# Patient Record
Sex: FEMALE | Race: BLACK OR AFRICAN AMERICAN | NOT HISPANIC OR LATINO | Employment: UNEMPLOYED | URBAN - METROPOLITAN AREA
[De-identification: names, ages, dates, MRNs, and addresses within clinical notes are randomized per-mention and may not be internally consistent; named-entity substitution may affect disease eponyms.]

---

## 2018-02-26 ENCOUNTER — HISTORICAL (OUTPATIENT)
Dept: ADMINISTRATIVE | Facility: HOSPITAL | Age: 43
End: 2018-02-26

## 2018-03-02 ENCOUNTER — HISTORICAL (OUTPATIENT)
Dept: ADMINISTRATIVE | Facility: HOSPITAL | Age: 43
End: 2018-03-02

## 2018-04-02 ENCOUNTER — HISTORICAL (OUTPATIENT)
Dept: RADIOLOGY | Facility: HOSPITAL | Age: 43
End: 2018-04-02

## 2018-04-16 ENCOUNTER — HISTORICAL (OUTPATIENT)
Dept: ADMINISTRATIVE | Facility: HOSPITAL | Age: 43
End: 2018-04-16

## 2021-01-30 ENCOUNTER — HISTORICAL (OUTPATIENT)
Dept: ADMINISTRATIVE | Facility: HOSPITAL | Age: 46
End: 2021-01-30

## 2021-02-01 LAB — FINAL CULTURE: NORMAL

## 2022-04-07 ENCOUNTER — HISTORICAL (OUTPATIENT)
Dept: ADMINISTRATIVE | Facility: HOSPITAL | Age: 47
End: 2022-04-07

## 2022-04-24 VITALS
HEIGHT: 64 IN | DIASTOLIC BLOOD PRESSURE: 100 MMHG | OXYGEN SATURATION: 100 % | SYSTOLIC BLOOD PRESSURE: 156 MMHG | WEIGHT: 220.44 LBS | BODY MASS INDEX: 37.63 KG/M2

## 2022-05-01 NOTE — HISTORICAL [facility] CERNER
This is a historical note converted from [employer]. Formatting and pictures may have been removed.  Please reference [employer] for original formatting and attached multimedia. Chief Complaint  Frequent urination with burning X 3 days. Vomited X 2 since last night. No known COVID exposure. Recurrent UTIs with vomiting.  History of Present Illness  Pt?45 Years?Black or ?Female?seen and evaluated in a limited status for concern for COVID-19. In order to decrease the risk of exposure to the hospital and health care workers, only a limited exam was done.  Today presents to clinic c/o Frequent urination with burning X 3 days. Vomited X 2 since last night. No known COVID exposure. Vomited once last night and once today.  Last UTI diagnosed 1/20/2021. Pt prescribed Macrobid.  Last urine culture showing in system resulted in Citrobacter koseri  Pt reports frequency, urgency, burning and lower abd discomfort. Denies fever.  Review of Systems  General: negative except as stated in hpi  Skin: negative except as stated in hpi  HEENT: negative except as stated in hpi  Respiratory: negative except as stated in hpi  CV: negative except as stated in hpi  GI: negative except as stated in hpi  : negative except as stated in hpi  MS: negative except as stated in hpi  Neuro: negative except as stated in hpi  Hematologic: negative except as stated in hpi  Endocrine: negative except as stated in hpi  Psych: negative except as stated in hpi  Physical Exam  Vitals & Measurements  T:?36.7? ?C (Oral)? HR:?77(Peripheral)? RR:?20? BP:?156/100? SpO2:?100%?  HT:?162.00?cm? WT:?100.000?kg? BMI:?38.1?  General: Alert and oriented, No acute distress.  Head: Normocephalic, Atraumatic.  Neck/Thyroid: Supple, Non-tender, No carotid bruit, No lymphadenopathy, Full range of motion.  Respiratory: Clear to auscultation bilaterally, Respirations non-labored, Breath sounds equal, Symmetrical chest wall expansion, No wheezes, rales or  rhonchi.  Cardiovascular: Regular rate and rhythm, No murmurs, rubs or gallops.  Gastrointestinal: Soft, Non-tender, Non-distended, Normal bowel sounds.  Assessment/Plan  1.?UTI (urinary tract infection)?N39.0,?UTI (urinary tract infection)?N39.0  Urine dip showed trace LE and blood, neg nitrite  Urine micro and culture pending, will notify  Rocephin 1GM IM now in clinic-pt requests injection to jump start  Pyridium 200mg po TID prn spasm x 3 days only  Zofran 8mg ODT prn nausea/vomiting  Increase oral fluid intake of water. Empty the bladder often, washing hands after going to the bathroom.?Avoid bubble baths and sitting in soapy bath water for long periods of time. Females should wipe front to back.?Eliminate bladder irritants such as spicy foods, caffeine, tomatoes, and citrus foods from diet.?  Ordered:  cefTRIAXone, 1 gm, form: Injection, IM, Once-Unscheduled, first dose 01/30/21 15:54:00 CST  Lidocaine inj., 2.1 mL, form: Injection, IM, Once-Unscheduled, first dose 01/30/21 15:54:00 CST  ondansetron, 8 mg = 1 tab(s), Oral, q8hr, # 15 tab(s), 0 Refill(s), Pharmacy: [facility]/pharmacy #[id number], 162, cm, Height/Length Dosing, 01/30/21 15:37:00 CST, 100, kg, Weight Dosing, 01/30/21 15:37:00 CST  phenazopyridine, 200 mg = 1 tab(s), Oral, TID, X 3 day(s), # 9 tab(s), 0 Refill(s), Pharmacy: [facility]/pharmacy #[id number], 162, cm, Height/Length Dosing, 01/30/21 15:37:00 CST, 100, kg, Weight Dosing, 01/30/21 15:37:00 CST  ?  2.?Obesity?E66.9  BMI?38.1.? Goal BMI less than 30.  Aerobic exercise 150min/week  Avoid soda, simple sugars, saturated fats and processed foods, sweets, excessive rice, pasta, potatoes, bread  Limit fast food  Eat plenty of fresh fruits and vegetables  Ordered:  Office/Outpatient Visit Level 4 Established 17790 PC, Obesity, 01/30/21 16:01:00 CST  ?  Orders:  Urinalysis Microscopic [facility], Routine collect, Urine, Collected, 01/30/21 15:39:00 CST, Stop date 01/30/21 15:39:00 CST, Nurse collect, Burning with  urination  Urinalysis with Microscopic if Indicated, Routine collect, Urine, 01/30/21 15:39:00 CST, Stop date 01/30/21 15:39:00 CST, Nurse collect, Burning with urination  Urine Culture [id number], Routine collect, 01/30/21 15:43:00 CST, Urine, Nurse collect, Stop date 01/30/21 15:43:00 CST, Burning with urination   Problem List/Past Medical History  Ongoing  Obesity  Historical  No qualifying data  Procedure/Surgical History  bilateral Tubal Ligation  partial hysterectomy   Medications  ALPRAZOLAM 0.5MG TAB, 0.5 mg= 1 tab(s), Oral, BID,? ?Not taking  alPRAzolam 1 mg oral tablet, 1 mg= 1 tab(s), Oral, TID  AMPHET/DEXTR 10MG TAB, 10 mg= 1 tab(s), Oral, Daily,? ?Not Taking, Completed Rx  AMPHET/DEXTR 30MG TAB, 30 mg= 1 tab(s), Oral, qAM,? ?Not taking  cefTRIAXone, 1 gm= 1 EA, IM, Once-Unscheduled  escitalopram 20 mg oral tablet, 20 mg= 1 tab(s), Oral, Daily  Flonase 50 mcg/inh nasal spray, 1 spray(s), Nasal, Daily,? ?Not Taking per Prescriber  lidocaine 1% injectable solution, 2.1 mL, IM, Once-Unscheduled  phenazopyridine 200 mg oral tablet, 200 mg= 1 tab(s), Oral, TID  promethazine 6.25 mg/5 mL oral syrup, 25 mg= 20 mL, Oral, QID, PRN,? ?Not Taking per Prescriber  Zofran ODT 8 mg oral tablet, disintegrating, 8 mg= 1 tab(s), Oral, q8hr  Allergies  traMADol?(Hives, headache)  Social History  Abuse/Neglect  No, 01/30/2021  Alcohol  Never, 06/27/2016  Employment/School  Unemployed, 01/30/2021  Substance Use  Never, 06/27/2016  Tobacco  Never (less than 100 in lifetime), No, 01/30/2021  Family History  Family history is negative  Health Maintenance  Health Maintenance  ???Pending?(in the next year)  ??? ??OverDue  ??? ? ? ?Influenza Vaccine due??10/01/20??and every 1??day(s)  ??? ??Due?  ??? ? ? ?Alcohol Misuse Screening due??01/02/21??and every 1??year(s)  ??? ? ? ?Tetanus Vaccine due??01/30/21??and every 10??year(s)  ??? ??Due In Future?  ??? ? ? ?Diabetes Screening not due until??07/01/21??and every 3??year(s)  ??? ? ?  ?Obesity Screening not due until??01/01/22??and every 1??year(s)  ???Satisfied?(in the past 1 year)  ??? ??Satisfied?  ??? ? ? ?ADL Screening on??01/30/21.??Satisfied by Champ Bradley  ??? ? ? ?Blood Pressure Screening on??01/30/21.??Satisfied by Champ Bradley  ??? ? ? ?Body Mass Index Check on??01/30/21.??Satisfied by Champ Bradley  ??? ? ? ?Depression Screening on??01/30/21.??Satisfied by Champ Bradley  ??? ? ? ?Influenza Vaccine on??01/30/21.??Satisfied by Champ Bradley  ??? ? ? ?Obesity Screening on??01/30/21.??Satisfied by Champ Bradley  ?  Lab Results  Test Name Test Result Date/Time   Urine Color Urine Dipstick Yellow 01/30/2021 15:31 CST   Urine Appearance Urine Dipstick Slightly cloudy 01/30/2021 15:31 CST   pH Urine Dipstick 6 01/30/2021 15:31 CST   Specific Gravity Urine Dipstick 1.030 01/30/2021 15:31 CST   Blood Urine Dipstick 1+ Small 01/30/2021 15:31 CST   Glucose Urine Dipstick Negative 01/30/2021 15:31 CST   Ketones Urine Dipstick Negative 01/30/2021 15:31 CST   Protein Urine Dipstick 1+ (30 mg/dl) 01/30/2021 15:31 CST   Bilirubin Urine Dipstick Negative 01/30/2021 15:31 CST   Urobilinogen Urine Dipstick 0.2 mg/dl 01/30/2021 15:31 CST   Leukocytes Urine Dipstick Trace 01/30/2021 15:31 CST   Nitrite Urine Dipstick Negative 01/30/2021 15:31 CST

## 2023-11-13 ENCOUNTER — HOSPITAL ENCOUNTER (EMERGENCY)
Facility: HOSPITAL | Age: 48
Discharge: HOME | End: 2023-11-14
Attending: EMERGENCY MEDICINE
Payer: MEDICAID

## 2023-11-13 DIAGNOSIS — N28.9 RENAL LESION: ICD-10-CM

## 2023-11-13 DIAGNOSIS — R10.31 RIGHT LOWER QUADRANT ABDOMINAL PAIN: Primary | ICD-10-CM

## 2023-11-13 DIAGNOSIS — Z75.8 DOES NOT HAVE PRIMARY CARE PROVIDER: ICD-10-CM

## 2023-11-13 LAB
APPEARANCE UR: CLEAR
BACTERIA #/AREA URNS AUTO: ABNORMAL /HPF
BASOPHILS # BLD AUTO: 0.06 X10(3)/MCL
BASOPHILS NFR BLD AUTO: 0.6 %
BILIRUB UR QL STRIP.AUTO: NEGATIVE
COLOR UR AUTO: YELLOW
EOSINOPHIL # BLD AUTO: 0.28 X10(3)/MCL (ref 0–0.9)
EOSINOPHIL NFR BLD AUTO: 2.8 %
ERYTHROCYTE [DISTWIDTH] IN BLOOD BY AUTOMATED COUNT: 12.6 % (ref 11.5–17)
GLUCOSE UR QL STRIP.AUTO: NORMAL
HCT VFR BLD AUTO: 41.7 % (ref 37–47)
HGB BLD-MCNC: 14 G/DL (ref 12–16)
HYALINE CASTS #/AREA URNS LPF: ABNORMAL /LPF
IMM GRANULOCYTES # BLD AUTO: 0.05 X10(3)/MCL (ref 0–0.04)
IMM GRANULOCYTES NFR BLD AUTO: 0.5 %
KETONES UR QL STRIP.AUTO: NEGATIVE
LEUKOCYTE ESTERASE UR QL STRIP.AUTO: 25
LYMPHOCYTES # BLD AUTO: 2.51 X10(3)/MCL (ref 0.6–4.6)
LYMPHOCYTES NFR BLD AUTO: 25 %
MCH RBC QN AUTO: 30 PG (ref 27–31)
MCHC RBC AUTO-ENTMCNC: 33.6 G/DL (ref 33–36)
MCV RBC AUTO: 89.3 FL (ref 80–94)
MONOCYTES # BLD AUTO: 0.65 X10(3)/MCL (ref 0.1–1.3)
MONOCYTES NFR BLD AUTO: 6.5 %
MUCOUS THREADS URNS QL MICRO: ABNORMAL /LPF
NEUTROPHILS # BLD AUTO: 6.49 X10(3)/MCL (ref 2.1–9.2)
NEUTROPHILS NFR BLD AUTO: 64.6 %
NITRITE UR QL STRIP.AUTO: NEGATIVE
NRBC BLD AUTO-RTO: 0 %
PH UR STRIP.AUTO: 5.5 [PH]
PLATELET # BLD AUTO: 270 X10(3)/MCL (ref 130–400)
PMV BLD AUTO: 10.7 FL (ref 7.4–10.4)
PROT UR QL STRIP.AUTO: ABNORMAL
RBC # BLD AUTO: 4.67 X10(6)/MCL (ref 4.2–5.4)
RBC #/AREA URNS AUTO: ABNORMAL /HPF
RBC UR QL AUTO: NEGATIVE
SP GR UR STRIP.AUTO: 1.03 (ref 1–1.03)
SQUAMOUS #/AREA URNS LPF: ABNORMAL /HPF
UROBILINOGEN UR STRIP-ACNC: ABNORMAL
WBC # SPEC AUTO: 10.04 X10(3)/MCL (ref 4.5–11.5)
WBC #/AREA URNS AUTO: ABNORMAL /HPF

## 2023-11-13 PROCEDURE — 85025 COMPLETE CBC W/AUTO DIFF WBC: CPT | Performed by: EMERGENCY MEDICINE

## 2023-11-13 PROCEDURE — 83735 ASSAY OF MAGNESIUM: CPT | Performed by: EMERGENCY MEDICINE

## 2023-11-13 PROCEDURE — 96375 TX/PRO/DX INJ NEW DRUG ADDON: CPT

## 2023-11-13 PROCEDURE — 99285 EMERGENCY DEPT VISIT HI MDM: CPT | Mod: 25

## 2023-11-13 PROCEDURE — 63600175 PHARM REV CODE 636 W HCPCS: Performed by: EMERGENCY MEDICINE

## 2023-11-13 PROCEDURE — 80048 BASIC METABOLIC PNL TOTAL CA: CPT | Performed by: EMERGENCY MEDICINE

## 2023-11-13 PROCEDURE — 81001 URINALYSIS AUTO W/SCOPE: CPT | Performed by: EMERGENCY MEDICINE

## 2023-11-13 PROCEDURE — 96374 THER/PROPH/DIAG INJ IV PUSH: CPT

## 2023-11-13 RX ORDER — DULOXETIN HYDROCHLORIDE 30 MG/1
30 CAPSULE, DELAYED RELEASE ORAL EVERY MORNING
COMMUNITY
Start: 2023-11-11

## 2023-11-13 RX ORDER — ALPRAZOLAM 1 MG/1
1 TABLET ORAL 3 TIMES DAILY
COMMUNITY

## 2023-11-13 RX ORDER — KETOROLAC TROMETHAMINE 30 MG/ML
15 INJECTION, SOLUTION INTRAMUSCULAR; INTRAVENOUS
Status: DISCONTINUED | OUTPATIENT
Start: 2023-11-13 | End: 2023-11-13

## 2023-11-13 RX ORDER — ONDANSETRON 2 MG/ML
4 INJECTION INTRAMUSCULAR; INTRAVENOUS
Status: COMPLETED | OUTPATIENT
Start: 2023-11-13 | End: 2023-11-13

## 2023-11-13 RX ORDER — KETOROLAC TROMETHAMINE 30 MG/ML
15 INJECTION, SOLUTION INTRAMUSCULAR; INTRAVENOUS
Status: COMPLETED | OUTPATIENT
Start: 2023-11-13 | End: 2023-11-13

## 2023-11-13 RX ADMIN — KETOROLAC TROMETHAMINE 15 MG: 30 INJECTION, SOLUTION INTRAMUSCULAR; INTRAVENOUS at 11:11

## 2023-11-13 RX ADMIN — ONDANSETRON 4 MG: 2 INJECTION INTRAMUSCULAR; INTRAVENOUS at 11:11

## 2023-11-14 VITALS
BODY MASS INDEX: 38.82 KG/M2 | WEIGHT: 227.38 LBS | DIASTOLIC BLOOD PRESSURE: 90 MMHG | RESPIRATION RATE: 18 BRPM | HEIGHT: 64 IN | SYSTOLIC BLOOD PRESSURE: 123 MMHG | TEMPERATURE: 98 F | HEART RATE: 72 BPM | OXYGEN SATURATION: 99 %

## 2023-11-14 LAB
ANION GAP SERPL CALC-SCNC: 10 MEQ/L
BUN SERPL-MCNC: 17.4 MG/DL (ref 7–18.7)
CALCIUM SERPL-MCNC: 10.1 MG/DL (ref 8.4–10.2)
CHLORIDE SERPL-SCNC: 105 MMOL/L (ref 98–107)
CO2 SERPL-SCNC: 25 MMOL/L (ref 22–29)
CREAT SERPL-MCNC: 0.83 MG/DL (ref 0.55–1.02)
CREAT/UREA NIT SERPL: 21
GFR SERPLBLD CREATININE-BSD FMLA CKD-EPI: >60 MLS/MIN/1.73/M2
GLUCOSE SERPL-MCNC: 108 MG/DL (ref 74–100)
MAGNESIUM SERPL-MCNC: 2.1 MG/DL (ref 1.6–2.6)
POTASSIUM SERPL-SCNC: 3.6 MMOL/L (ref 3.5–5.1)
SODIUM SERPL-SCNC: 140 MMOL/L (ref 136–145)

## 2023-11-14 PROCEDURE — 99285 EMERGENCY DEPT VISIT HI MDM: CPT | Mod: 25

## 2023-11-14 PROCEDURE — 25500020 PHARM REV CODE 255: Performed by: EMERGENCY MEDICINE

## 2023-11-14 RX ORDER — CYCLOBENZAPRINE HCL 10 MG
10 TABLET ORAL 3 TIMES DAILY PRN
Qty: 15 TABLET | Refills: 0 | Status: SHIPPED | OUTPATIENT
Start: 2023-11-14 | End: 2023-11-19

## 2023-11-14 RX ORDER — ONDANSETRON 4 MG/1
4 TABLET, ORALLY DISINTEGRATING ORAL EVERY 8 HOURS PRN
Qty: 14 TABLET | Refills: 0 | Status: SHIPPED | OUTPATIENT
Start: 2023-11-14

## 2023-11-14 RX ORDER — IBUPROFEN 800 MG/1
800 TABLET ORAL EVERY 6 HOURS PRN
Qty: 20 TABLET | Refills: 0 | Status: SHIPPED | OUTPATIENT
Start: 2023-11-14

## 2023-11-14 RX ADMIN — IOHEXOL 100 ML: 350 INJECTION, SOLUTION INTRAVENOUS at 12:11

## 2023-11-14 NOTE — ED PROVIDER NOTES
ED PROVIDER NOTE  11/13/2023    CHIEF COMPLAINT:   Chief Complaint   Patient presents with    Hip Pain     RIGHT  SIDED  PELVIC PAIN  AND  PAIN  IN  THE  RIGHT  THIGH AREA       HISTORY OF PRESENT ILLNESS:   Lilian Goddard is a 48 y.o. female who presents with chief complaint Abdominal pain. Onset was 2-3 days ago with RLQ abdominal pain that radiates down her right leg, aggravated with movement, improved with laying on her stomach and putting pressure on the area. Associated symptoms of nausea and vomiting. She reports it feels like the same pain she had last month when she was seen at [facility] and was diagnosed with UTI and found to have left renal lesion concerning for RCC, states that she was given antibiotics and toradol but does not feel like this medicine helped. She states that the pain eventually went away until it recurred a couple of days ago.    The history is provided by the patient.         REVIEW OF SYSTEMS: as noted in the HPI.  NURSING NOTES REVIEWED      PAST MEDICAL/SURGICAL HISTORY: No past medical history on file. No past surgical history on file.    FAMILY HISTORY: No family history on file.    SOCIAL HISTORY:      ALLERGIES: Review of patient's allergies indicates:  No Known Allergies    PHYSICAL EXAM:  Initial Vitals [11/13/23 2304]   BP Pulse Resp Temp SpO2   131/71 80 20 98.2 °F (36.8 °C) 98 %      MAP       --         Physical Exam    Nursing note and vitals reviewed.  Constitutional: She appears well-developed and well-nourished.   HENT:   Head: Normocephalic and atraumatic.   Mouth/Throat: Uvula is midline and mucous membranes are normal.   Eyes: EOM are normal. Pupils are equal, round, and reactive to light.   Neck: Trachea normal. Neck supple.   Cardiovascular:  Normal rate, regular rhythm and normal pulses.           Pulmonary/Chest: Effort normal and breath sounds normal.   Abdominal: Abdomen is soft. Bowel sounds are normal. There is abdominal tenderness in the right lower  quadrant. There is tenderness at McBurney's point. There is no rebound and no guarding. positive obturator sign  Musculoskeletal:         General: Normal range of motion.      Cervical back: Neck supple.     Neurological: She is alert and oriented to person, place, and time. GCS eye subscore is 4. GCS verbal subscore is 5. GCS motor subscore is 6.   Skin: Skin is warm and dry.   Psychiatric: She has a normal mood and affect. Her speech is normal. Thought content normal.         RESULTS:  Labs Reviewed   URINALYSIS, REFLEX TO URINE CULTURE - Abnormal; Notable for the following components:       Result Value    Specific Gravity, UA 1.033 (*)     Protein, UA Trace (*)     Urobilinogen, UA 1+ (*)     Leukocyte Esterase, UA 25 (*)     Squamous Epithelial Cells, UA Few (*)     Mucous, UA Trace (*)     Hyaline Casts, UA 0-2 (*)     All other components within normal limits   BASIC METABOLIC PANEL - Abnormal; Notable for the following components:    Glucose Level 108 (*)     All other components within normal limits   CBC WITH DIFFERENTIAL - Abnormal; Notable for the following components:    MPV 10.7 (*)     IG# 0.05 (*)     All other components within normal limits   MAGNESIUM - Normal   CBC W/ AUTO DIFFERENTIAL    Narrative:     The following orders were created for panel order CBC auto differential.  Procedure                               Abnormality         Status                     ---------                               -----------         ------                     CBC with Differential[[id number]]       Abnormal            Final result                 Please view results for these tests on the individual orders.     Imaging Results              CT Abdomen Pelvis With IV Contrast (Preliminary result)  Result time 11/14/23 01:25:34      Preliminary result by Jomar Quintana MD (11/14/23 01:25:34)                   Narrative:    START OF REPORT:  Technique: CT of the abdomen and pelvis was performed with axial  images as well as sagittal and coronal reconstruction images with intravenous contrast but without oral contrast.    Comparison: None available.    Clinical History: Hip Pain (RIGHT SIDED PELVIC PAIN AND PAIN IN THE RIGHT THIGH AREA.    Dosage Information: Automated Exposure Control was utilized.    Findings:  Lines and Tubes: None.  Thorax:  Lungs: There is minimal nonspecific dependent change at the lung bases.  Pleura: No effusions or thickening.  Heart: The heart size is within normal limits.  Abdomen:  Abdominal Wall: No abdominal wall pathology is seen.  Liver: There are two (2) non-enhancing cystic structures in the right liver lobe (seen at series 2 image 33 and 36) measuring up to 8 mm. Otherwise, the liver appears unremarkable.  Biliary System: No intrahepatic or extrahepatic biliary duct dilatation is seen.  Gallbladder: The gallbladder appears unremarkable.  Pancreas: The pancreas appears unremarkable.  Spleen: The spleen appears unremarkable.  Adrenals: The adrenal glands appear unremarkable.  Kidneys: The right kidney appears unremarkable with no stones cysts masses or hydronephrosis. A well-circumscribed exophytic 1.0 cm hypodense nodule (+129 HU) is noted in the posterolateral interpolar region of the left kidney (centered on image 54 series 2). This may represent a proteinaceous cyst.  Aorta: The abdominal aorta appears unremarkable.  IVC: Unremarkable.  Bowel:  Esophagus: Thickening versus underdistention of the distal esophagus.  Stomach: The stomach appears unremarkable.  Duodenum: Unremarkable appearing duodenum.  Small Bowel: The small bowel appears unremarkable.  Appendix: The appendix appears unremarkable seen on Image 99, Series 2 through Image 114, Series 2.  Peritoneum: No intraperitoneal free air or ascites is seen.    Pelvis:  Bladder: The bladder appears unremarkable.  Female:  Uterus: The uterus is not identified correlate with history of hysterectomy.  Ovaries: No adnexal masses are  seen.    Bony structures:  Dorsal Spine: The visualized dorsal spine appears unremarkable.  Bony Pelvis: The visualized bony structures of the pelvis appear unremarkable.      Impression:  1. Thickening versus underdistention of the distal esophagus. Correlate clinically as regards possible reflux esophagitis.  2. A well-circumscribed exophytic 1.0 cm hypodense nodule (+129 HU) is noted in the posterolateral interpolar region of the left kidney (centered on image 54 series 2). This may represent a proteinaceous cyst. Correlate with clinical and laboratory findings as regards additional evaluation and follow-up to confirm stability as a neoplastic process is not entirely excluded.  3. No acute intraabdominal or pelvic solid organ or bowel pathology identified. Details and other findings as discussed above.                          Wet Read by Seth Garcia DO (11/14/23 01:03:58, [facility], Emergency Medicine)    Appendix appears normal. No bowel obstruction.                                    PROCEDURES:  Procedures    ECG:       ED COURSE AND MEDICAL DECISION MAKING:  Medications   ondansetron injection 4 mg (4 mg Intravenous Given 11/13/23 2326)   ketorolac injection 15 mg (15 mg Intravenous Given 11/13/23 2326)   iohexoL (OMNIPAQUE 350) injection 100 mL (100 mLs Intravenous Given 11/14/23 0051)     ED Course as of 11/14/23 0210   Mon Nov 13, 2023 2343 WBC: 10.04 [IB]   2343 Hemoglobin: 14.0 [IB]   2343 Platelet Count: 270 [IB]   2355 NITRITE UA: Negative [IB]   2355 Leukocytes, UA(!): 25 [IB]   2355 WBC, UA: 0-5 [IB]   2355 Bacteria, UA: None Seen [IB]   2355 RBC, UA: 0-5 [IB]   Tue Nov 14, 2023   0029 Creatinine: 0.83 [IB]   0029 Magnesium : 2.10 [IB]      ED Course User Index  [IB] Seth Garcia DO        Medical Decision Making  This patient presents with abdominal pain of unclear etiology. A CT scan was performed to evaluate for potential causes of the abdominal pain, however,  neither the clinical exam nor the CT has identified an emergent etiology for the abdominal pain. Specifically, given the benign exam, the laboratory studies, and unremarkable CT, I have a very low suspicion for appendicitis, ischemic bowel, bowel perforation, or any other life threatening disease. I have discussed with the patient the level of uncertainty with undifferentiated abdominal pain and clearly explained the need to follow-up as noted on the discharge instructions, or return to the Emergency Department immediately if the pain worsens, develops fever, persistent and uncontrollable vomiting, or for any new symptoms or concerns.  Given strict ED return precautions. I have spoken with the patient and/or caregivers. I have explained the patient's condition, diagnoses and treatment plan based on the information available to me at this time. I have answered the patient's and/or caregiver's questions and addressed any concerns. The patient and/or caregivers have as good an understanding of the patient's diagnosis, condition and treatment plan as can be expected at this point. The vital signs have been stable. The patient's condition is stable and appropriate for discharge from the emergency department.     The patient will pursue further outpatient evaluation with the primary care physician or other designated or consulting physician as outlined in the discharge instructions. The patient and/or caregivers are agreeable to this plan of care and follow-up instructions have been explained in detail. The patient and/or caregivers have received these instructions in written format and have expressed an understanding of the discharge instructions. The patient and/or caregivers are aware that any significant change in condition or worsening of symptoms should prompt an immediate return to this or the closest emergency department or a call to 911.    Amount and/or Complexity of Data Reviewed  External Data Reviewed:  notes.  Labs: ordered. Decision-making details documented in ED Course.  Radiology: ordered and independent interpretation performed.    Risk  OTC drugs.  Prescription drug management.        CLINICAL IMPRESSION:  1. Right lower quadrant abdominal pain    2. Renal lesion    3. Does not have primary care provider        DISPOSITION:   ED Disposition Condition    Discharge Stable            ED Prescriptions       Medication Sig Dispense Start Date End Date Auth. Provider    ibuprofen (ADVIL,MOTRIN) 800 MG tablet Take 1 tablet (800 mg total) by mouth every 6 (six) hours as needed for Pain. 20 tablet 11/14/2023 -- Seth Garcia DO    cyclobenzaprine (FLEXERIL) 10 MG tablet Take 1 tablet (10 mg total) by mouth 3 (three) times daily as needed for Muscle spasms. 15 tablet 11/14/2023 11/19/2023 Seth Garcia DO    ondansetron (ZOFRAN-ODT) 4 MG TbDL Take 1 tablet (4 mg total) by mouth every 8 (eight) hours as needed (nausea and vomiting). 14 tablet 11/14/2023 -- Seth Garcia DO          Follow-up Information       Follow up With Specialties Details Why Contact Info    [facility] Emergency Medicine  If symptoms worsen [address] [facility] [id number] [phone]               Seth Garcia DO  11/14/23 4165

## 2023-11-16 ENCOUNTER — OFFICE VISIT (OUTPATIENT)
Dept: INTERNAL MEDICINE | Facility: CLINIC | Age: 48
End: 2023-11-16
Payer: MEDICAID

## 2023-11-16 VITALS
BODY MASS INDEX: 38.76 KG/M2 | HEART RATE: 68 BPM | RESPIRATION RATE: 18 BRPM | HEIGHT: 64 IN | OXYGEN SATURATION: 100 % | TEMPERATURE: 98 F | WEIGHT: 227 LBS | DIASTOLIC BLOOD PRESSURE: 85 MMHG | SYSTOLIC BLOOD PRESSURE: 134 MMHG

## 2023-11-16 DIAGNOSIS — Z12.4 PAP SMEAR FOR CERVICAL CANCER SCREENING: ICD-10-CM

## 2023-11-16 DIAGNOSIS — N28.89 NODULE OF KIDNEY: Primary | ICD-10-CM

## 2023-11-16 DIAGNOSIS — Z13.1 DIABETES MELLITUS SCREENING: ICD-10-CM

## 2023-11-16 DIAGNOSIS — Z12.11 SCREEN FOR COLON CANCER: ICD-10-CM

## 2023-11-16 DIAGNOSIS — Z75.8 DOES NOT HAVE PRIMARY CARE PROVIDER: ICD-10-CM

## 2023-11-16 DIAGNOSIS — Z11.59 NEED FOR HEPATITIS C SCREENING TEST: ICD-10-CM

## 2023-11-16 DIAGNOSIS — Z11.4 ENCOUNTER FOR SCREENING FOR HIV: ICD-10-CM

## 2023-11-16 DIAGNOSIS — Z12.39 ENCOUNTER FOR SCREENING FOR MALIGNANT NEOPLASM OF BREAST, UNSPECIFIED SCREENING MODALITY: ICD-10-CM

## 2023-11-16 PROCEDURE — 99215 OFFICE O/P EST HI 40 MIN: CPT | Mod: PBBFAC | Performed by: INTERNAL MEDICINE

## 2023-11-16 NOTE — PROGRESS NOTES
Internal Medicine Clinic Note    Patient Name: Lilian Goddard  YOB: 1975   MRN: [id number]  Date: 11/16/2023  Home Address: [address] [place] [address] [address] [id number]      Subjective:     Chief Complaint:   Chief Complaint   Patient presents with    Establish Care     Pt here today to establish care. Recent ER visit. Pt continues to have lower RLQ pain.         HPI:  The patient is a 48 y.o. year old female with hx of . She presents today to establish care. Her main complaint is RLQ pain. She was seen in the ED 11/13/23 and back in October for the same issue. Both times she got a CT of the abdomen that showed a left kidney nodule but nothing else acute such as appendicitis or gallstones.     Since last visit she reports:NA  Available notes and lab results since last visit were reviewed.    No past medical history on file.     No past surgical history on file.    Allergy:  Review of patient's allergies indicates:   Allergen Reactions    Tramadol Other (See Comments) and Hives        Current Medications:    Current Outpatient Medications:     ALPRAZolam (XANAX) 1 MG tablet, Take 1 mg by mouth 3 (three) times daily., Disp: , Rfl:     ibuprofen (ADVIL,MOTRIN) 800 MG tablet, Take 1 tablet (800 mg total) by mouth every 6 (six) hours as needed for Pain., Disp: 20 tablet, Rfl: 0    ondansetron (ZOFRAN-ODT) 4 MG TbDL, Take 1 tablet (4 mg total) by mouth every 8 (eight) hours as needed (nausea and vomiting)., Disp: 14 tablet, Rfl: 0    cyclobenzaprine (FLEXERIL) 10 MG tablet, Take 1 tablet (10 mg total) by mouth 3 (three) times daily as needed for Muscle spasms. (Patient not taking: Reported on 11/16/2023), Disp: 15 tablet, Rfl: 0    DULoxetine (CYMBALTA) 30 MG capsule, Take 30 mg by mouth every morning., Disp: , Rfl:      Social History:   has no history on file for tobacco use, alcohol use, and drug use.     No family history on file.       There is no immunization history on file for this patient.    Review of  Systems   Constitutional:  Negative for chills and fever.   Respiratory:  Negative for cough and shortness of breath.    Cardiovascular:  Negative for chest pain, palpitations and leg swelling.   Gastrointestinal:  Positive for abdominal pain and constipation. Negative for blood in stool, diarrhea, nausea and vomiting.   Genitourinary:  Negative for dysuria and urgency.       Objective:      Physical Exam  There were no vitals filed for this visit.     Wt Readings from Last 3 Encounters:   11/13/23 103.1 kg (227 lb 6.5 oz)   01/30/21 100 kg (220 lb 7.4 oz)       Physical Exam  Vitals and nursing note reviewed.   Constitutional:       General: She is not in acute distress.     Appearance: Normal appearance. She is obese. She is not ill-appearing.   HENT:      Head: Normocephalic and atraumatic.      Mouth/Throat:      Mouth: Mucous membranes are moist.      Pharynx: Oropharynx is clear.   Eyes:      Extraocular Movements: Extraocular movements intact.      Conjunctiva/sclera: Conjunctivae normal.      Pupils: Pupils are equal, round, and reactive to light.   Cardiovascular:      Rate and Rhythm: Normal rate and regular rhythm.      Pulses: Normal pulses.      Heart sounds: Normal heart sounds. No murmur heard.  Pulmonary:      Effort: Pulmonary effort is normal. No respiratory distress.      Breath sounds: Normal breath sounds.   Abdominal:      General: Abdomen is flat. Bowel sounds are normal.      Palpations: Abdomen is soft. There is no mass.      Tenderness: There is abdominal tenderness (RLQ). There is no guarding or rebound.   Musculoskeletal:         General: No swelling.      Right lower leg: No edema.      Left lower leg: No edema.   Skin:     General: Skin is warm and dry.   Neurological:      General: No focal deficit present.      Mental Status: She is alert and oriented to person, place, and time.          There is no height or weight on file to calculate BMI.      Admission on 11/13/2023, Discharged on  11/14/2023   Component Date Value Ref Range Status    Color, UA 11/13/2023 Yellow  Yellow, Light-Yellow, Dark Yellow, Ayla, Straw Final    Appearance, UA 11/13/2023 Clear  Clear Final    Specific [place], UA 11/13/2023 1.033 (H)  1.005 - 1.030 Final    pH, UA 11/13/2023 5.5  5.0 - 8.5 Final    Protein, UA 11/13/2023 Trace (A)  Negative Final    Glucose, UA 11/13/2023 Normal  Negative, Normal Final    Ketones, UA 11/13/2023 Negative  Negative Final    Blood, UA 11/13/2023 Negative  Negative Final    Bilirubin, UA 11/13/2023 Negative  Negative Final    Urobilinogen, UA 11/13/2023 1+ (A)  0.2, 1.0, Normal Final    Nitrites, UA 11/13/2023 Negative  Negative Final    Leukocyte Esterase, UA 11/13/2023 25 (A)  Negative Final    WBC, UA 11/13/2023 0-5  None Seen, 0-2, 3-5, 0-5 /HPF Final    Bacteria, UA 11/13/2023 None Seen  None Seen /HPF Final    Squamous Epithelial Cells, UA 11/13/2023 Few (A)  None Seen /HPF Final    Mucous, UA 11/13/2023 Trace (A)  None Seen /LPF Final    Hyaline Casts, UA 11/13/2023 0-2 (A)  None Seen /lpf Final    RBC, UA 11/13/2023 0-5  None Seen, 0-2, 3-5, 0-5 /HPF Final    Sodium Level 11/13/2023 140  136 - 145 mmol/L Final    Potassium Level 11/13/2023 3.6  3.5 - 5.1 mmol/L Final    Chloride 11/13/2023 105  98 - 107 mmol/L Final    Carbon Dioxide 11/13/2023 25  22 - 29 mmol/L Final    Glucose Level 11/13/2023 108 (H)  74 - 100 mg/dL Final    Blood Urea Nitrogen 11/13/2023 17.4  7.0 - 18.7 mg/dL Final    Creatinine 11/13/2023 0.83  0.55 - 1.02 mg/dL Final    BUN/Creatinine Ratio 11/13/2023 21   Final    Calcium Level Total 11/13/2023 10.1  8.4 - 10.2 mg/dL Final    Anion Gap 11/13/2023 10.0  mEq/L Final    eGFR 11/13/2023 >60  mls/min/1.73/m2 Final    Magnesium Level 11/13/2023 2.10  1.60 - 2.60 mg/dL Final    WBC 11/13/2023 10.04  4.50 - 11.50 x10(3)/mcL Final    RBC 11/13/2023 4.67  4.20 - 5.40 x10(6)/mcL Final    Hgb 11/13/2023 14.0  12.0 - 16.0 g/dL Final    Hct 11/13/2023 41.7  37.0 - 47.0 %  Final    MCV 11/13/2023 89.3  80.0 - 94.0 fL Final    MCH 11/13/2023 30.0  27.0 - 31.0 pg Final    MCHC 11/13/2023 33.6  33.0 - 36.0 g/dL Final    RDW 11/13/2023 12.6  11.5 - 17.0 % Final    Platelet 11/13/2023 270  130 - 400 x10(3)/mcL Final    MPV 11/13/2023 10.7 (H)  7.4 - 10.4 fL Final    Neut % 11/13/2023 64.6  % Final    Lymph % 11/13/2023 25.0  % Final    Mono % 11/13/2023 6.5  % Final    Eos % 11/13/2023 2.8  % Final    Basophil % 11/13/2023 0.6  % Final    Lymph # 11/13/2023 2.51  0.6 - 4.6 x10(3)/mcL Final    Neut # 11/13/2023 6.49  2.1 - 9.2 x10(3)/mcL Final    Mono # 11/13/2023 0.65  0.1 - 1.3 x10(3)/mcL Final    Eos # 11/13/2023 0.28  0 - 0.9 x10(3)/mcL Final    Baso # 11/13/2023 0.06  <=0.2 x10(3)/mcL Final    IG# 11/13/2023 0.05 (H)  0 - 0.04 x10(3)/mcL Final    IG% 11/13/2023 0.5  % Final    NRBC% 11/13/2023 0.0  % Final           Assessment:   Left kidney nodule  Had CT in October and again in November that describes a 1x9 mm nodule in the left kidney pole.   US of Left kidney   Refer to Urology    Abdominal Pain (RLQ)  Had CT of abdomen in October and November. There is no acute GI process but noted to have a Kidney lesion  With her telling me that the pain goes away with bowel movements but subsequently returns will try her on linzess for more daily bowel movements  If pain persists will repeat imaging    Lipid panel, HIV, HEP, Cologuard, mammogram, refer to GYN  Did not want vaccines    Disposition: RTC in 1mo    Keron Winkler, DO  Internal Medicine - PGY-3

## 2023-11-24 ENCOUNTER — HOSPITAL ENCOUNTER (OUTPATIENT)
Dept: RADIOLOGY | Facility: HOSPITAL | Age: 48
Discharge: HOME | End: 2023-11-24
Attending: INTERNAL MEDICINE
Payer: MEDICAID

## 2023-11-24 DIAGNOSIS — N28.89 NODULE OF KIDNEY: ICD-10-CM

## 2023-11-24 PROCEDURE — 76775 US EXAM ABDO BACK WALL LIM: CPT | Mod: TC

## 2023-12-12 ENCOUNTER — OFFICE VISIT (OUTPATIENT)
Dept: UROLOGY | Facility: CLINIC | Age: 48
End: 2023-12-12
Payer: MEDICAID

## 2023-12-12 VITALS
TEMPERATURE: 98 F | HEIGHT: 64 IN | OXYGEN SATURATION: 100 % | DIASTOLIC BLOOD PRESSURE: 83 MMHG | RESPIRATION RATE: 20 BRPM | SYSTOLIC BLOOD PRESSURE: 130 MMHG | WEIGHT: 227.63 LBS | HEART RATE: 65 BPM | BODY MASS INDEX: 38.86 KG/M2

## 2023-12-12 DIAGNOSIS — N28.89 NODULE OF KIDNEY: ICD-10-CM

## 2023-12-12 DIAGNOSIS — N28.9 RENAL LESION: ICD-10-CM

## 2023-12-12 PROCEDURE — 3075F PR MOST RECENT SYSTOLIC BLOOD PRESS GE 130-139MM HG: ICD-10-PCS | Mod: CPTII,,, | Performed by: NURSE PRACTITIONER

## 2023-12-12 PROCEDURE — 3075F SYST BP GE 130 - 139MM HG: CPT | Mod: CPTII,,, | Performed by: NURSE PRACTITIONER

## 2023-12-12 PROCEDURE — 99204 OFFICE O/P NEW MOD 45 MIN: CPT | Mod: S$PBB,,, | Performed by: NURSE PRACTITIONER

## 2023-12-12 PROCEDURE — 3079F PR MOST RECENT DIASTOLIC BLOOD PRESSURE 80-89 MM HG: ICD-10-PCS | Mod: CPTII,,, | Performed by: NURSE PRACTITIONER

## 2023-12-12 PROCEDURE — 3008F PR BODY MASS INDEX (BMI) DOCUMENTED: ICD-10-PCS | Mod: CPTII,,, | Performed by: NURSE PRACTITIONER

## 2023-12-12 PROCEDURE — 1159F MED LIST DOCD IN RCRD: CPT | Mod: CPTII,,, | Performed by: NURSE PRACTITIONER

## 2023-12-12 PROCEDURE — 3008F BODY MASS INDEX DOCD: CPT | Mod: CPTII,,, | Performed by: NURSE PRACTITIONER

## 2023-12-12 PROCEDURE — 99204 PR OFFICE/OUTPT VISIT, NEW, LEVL IV, 45-59 MIN: ICD-10-PCS | Mod: S$PBB,,, | Performed by: NURSE PRACTITIONER

## 2023-12-12 PROCEDURE — 99215 OFFICE O/P EST HI 40 MIN: CPT | Mod: PBBFAC | Performed by: NURSE PRACTITIONER

## 2023-12-12 PROCEDURE — 1159F PR MEDICATION LIST DOCUMENTED IN MEDICAL RECORD: ICD-10-PCS | Mod: CPTII,,, | Performed by: NURSE PRACTITIONER

## 2023-12-12 PROCEDURE — 3079F DIAST BP 80-89 MM HG: CPT | Mod: CPTII,,, | Performed by: NURSE PRACTITIONER

## 2023-12-12 NOTE — PROGRESS NOTES
Chief Complaint: No chief complaint on file.      HPI:  Patient is a 40-year-old female referred to Urology for kidney nodule.  Patient seen in emergency room on 11/13/2023 and 11/16/2023 with right lower quadrant abdominal pain and a history of persistent left kidney nodule.  Today patient denies any flank pain any lower abdominal pain she is currently on Levsin for abdominal discomfort intermittently.  Patient denies any urinary incontinence, urinary frequency, urinary urgency, gross hematuria.    Allergies:  Review of patient's allergies indicates:   Allergen Reactions    Tramadol Other (See Comments) and Hives       Medications:  Current Outpatient Medications   Medication Sig Dispense Refill    ALPRAZolam (XANAX) 1 MG tablet Take 1 mg by mouth 3 (three) times daily.      ibuprofen (ADVIL,MOTRIN) 800 MG tablet Take 1 tablet (800 mg total) by mouth every 6 (six) hours as needed for Pain. 20 tablet 0    linaCLOtide (LINZESS) 72 mcg Cap capsule Take 1 capsule (72 mcg total) by mouth before breakfast. 30 capsule 3    ondansetron (ZOFRAN-ODT) 4 MG TbDL Take 1 tablet (4 mg total) by mouth every 8 (eight) hours as needed (nausea and vomiting). 14 tablet 0    DULoxetine (CYMBALTA) 30 MG capsule Take 30 mg by mouth every morning.       No current facility-administered medications for this visit.       Review of Systems:  General: No fever, chills, fatigability, or weight loss.  Skin: No rashes, itching, or changes in color or texture of skin.  Chest: Denies AGUDELO, cyanosis, wheezing, cough, and sputum production.  Abdomen: Appetite fine. No weight loss. Denies diarrhea, abdominal pain, hematemesis, or blood in stool.  Musculoskeletal: No joint stiffness or swelling. Denies back pain.  : As above.  All other review of systems negative.    PMH:  No past medical history on file.    PSH:  Past Surgical History:   Procedure Laterality Date    HYSTERECTOMY         FamHx:  Family History   Problem Relation Age of Onset     Breast cancer Mother     Diabetes Father        SocHx:  Social History     Socioeconomic History    Marital status: Single   Tobacco Use    Smoking status: Never     Passive exposure: Never    Smokeless tobacco: Never   Substance and Sexual Activity    Alcohol use: Yes     Alcohol/week: 1.0 standard drink of alcohol     Types: 1 Drinks containing 0.5 oz of alcohol per week    Drug use: Not Currently    Sexual activity: Yes       Physical Exam:  Vitals:    12/12/23 0902   BP: 130/83   Pulse: 65   Resp: 20   Temp: 98.1 °F (36.7 °C)     General: A&Ox3, no apparent distress, no deformities  Neck: No masses, normal thyroid  Lungs: CTA jeremy, no use of accessory muscles  Heart: RRR, no arrhythmias  Abdomen: Soft, NT, ND, no masses, no hernias, no hepatosplenomegaly  Lymphatic: Neck and groin nodes negative  Skin: The skin is warm and dry. No jaundice.  Ext: No c/c/e.      Imaging:  Renal ultrasound revealed on 11/25/2023 : Indeterminate solid left renal lesion.  Renal mass protocol CT or MRI (without and with contrast) suggested for further evaluation.     Impression:  1. Renal lesion  - Ambulatory referral/consult to Urology    2. Nodule of kidney  - Ambulatory referral/consult to Urology      Plan:  Plan is to get a CT of the abdomen pelvis with and without contrast and have patient RTC 6 weeks for re-evaluation.  Instructed patient if develops any abnormal urologic symptoms notify clinic to be re-evaluate treated.

## 2023-12-13 ENCOUNTER — HOSPITAL ENCOUNTER (OUTPATIENT)
Dept: RADIOLOGY | Facility: HOSPITAL | Age: 48
Discharge: HOME | End: 2023-12-13
Attending: INTERNAL MEDICINE
Payer: MEDICAID

## 2023-12-13 DIAGNOSIS — Z12.39 ENCOUNTER FOR SCREENING FOR MALIGNANT NEOPLASM OF BREAST, UNSPECIFIED SCREENING MODALITY: ICD-10-CM

## 2023-12-13 PROCEDURE — 77063 MAMMO DIGITAL SCREENING BILAT WITH TOMO: ICD-10-PCS | Mod: 26,,, | Performed by: RADIOLOGY

## 2023-12-13 PROCEDURE — 77067 MAMMO DIGITAL SCREENING BILAT WITH TOMO: ICD-10-PCS | Mod: 26,,, | Performed by: RADIOLOGY

## 2023-12-13 PROCEDURE — 77067 SCR MAMMO BI INCL CAD: CPT | Mod: TC

## 2023-12-13 PROCEDURE — 77063 BREAST TOMOSYNTHESIS BI: CPT | Mod: 26,,, | Performed by: RADIOLOGY

## 2023-12-13 PROCEDURE — 77067 SCR MAMMO BI INCL CAD: CPT | Mod: 26,,, | Performed by: RADIOLOGY

## 2024-01-11 ENCOUNTER — HOSPITAL ENCOUNTER (OUTPATIENT)
Dept: RADIOLOGY | Facility: HOSPITAL | Age: 49
Discharge: HOME | End: 2024-01-11
Attending: NURSE PRACTITIONER
Payer: MEDICAID

## 2024-01-11 DIAGNOSIS — N28.89 NODULE OF KIDNEY: ICD-10-CM

## 2024-01-11 PROCEDURE — 74178 CT ABD&PLV WO CNTR FLWD CNTR: CPT | Mod: TC

## 2024-01-11 PROCEDURE — 25500020 PHARM REV CODE 255

## 2024-01-11 RX ADMIN — IOHEXOL 100 ML: 350 INJECTION, SOLUTION INTRAVENOUS at 09:01

## 2024-01-23 ENCOUNTER — OFFICE VISIT (OUTPATIENT)
Dept: UROLOGY | Facility: CLINIC | Age: 49
End: 2024-01-23
Payer: MEDICAID

## 2024-01-23 VITALS
WEIGHT: 221.81 LBS | DIASTOLIC BLOOD PRESSURE: 90 MMHG | TEMPERATURE: 98 F | OXYGEN SATURATION: 100 % | HEART RATE: 71 BPM | BODY MASS INDEX: 37.87 KG/M2 | RESPIRATION RATE: 20 BRPM | SYSTOLIC BLOOD PRESSURE: 126 MMHG | HEIGHT: 64 IN

## 2024-01-23 DIAGNOSIS — N28.9 RENAL LESION: Primary | ICD-10-CM

## 2024-01-23 PROCEDURE — 99213 OFFICE O/P EST LOW 20 MIN: CPT | Mod: S$PBB,,, | Performed by: NURSE PRACTITIONER

## 2024-01-23 PROCEDURE — 3074F SYST BP LT 130 MM HG: CPT | Mod: CPTII,,, | Performed by: NURSE PRACTITIONER

## 2024-01-23 PROCEDURE — 1159F MED LIST DOCD IN RCRD: CPT | Mod: CPTII,,, | Performed by: NURSE PRACTITIONER

## 2024-01-23 PROCEDURE — 99214 OFFICE O/P EST MOD 30 MIN: CPT | Mod: PBBFAC | Performed by: NURSE PRACTITIONER

## 2024-01-23 PROCEDURE — 3008F BODY MASS INDEX DOCD: CPT | Mod: CPTII,,, | Performed by: NURSE PRACTITIONER

## 2024-01-23 PROCEDURE — 1160F RVW MEDS BY RX/DR IN RCRD: CPT | Mod: CPTII,,, | Performed by: NURSE PRACTITIONER

## 2024-01-23 PROCEDURE — 3080F DIAST BP >= 90 MM HG: CPT | Mod: CPTII,,, | Performed by: NURSE PRACTITIONER

## 2024-01-23 NOTE — PROGRESS NOTES
Chief Complaint:   Chief Complaint   Patient presents with    renal lesion       HPI:  Patient is a 40-year-old female here for a 6 week F/U Kidney nodule.  Patient seen in emergency room on 11/13/2023 and 11/16/2023 with right lower quadrant abdominal pain and a history of persistent left kidney nodule.  The patient denies any urinary frequency, urinary urgency, flank pain, gross hematuria.  CT abdomen pelvis as noted below.    Allergies:  Review of patient's allergies indicates:   Allergen Reactions    Tramadol Other (See Comments) and Hives       Medications:  Current Outpatient Medications   Medication Sig Dispense Refill    ALPRAZolam (XANAX) 1 MG tablet Take 1 mg by mouth 3 (three) times daily.      DULoxetine (CYMBALTA) 30 MG capsule Take 30 mg by mouth every morning.      ibuprofen (ADVIL,MOTRIN) 800 MG tablet Take 1 tablet (800 mg total) by mouth every 6 (six) hours as needed for Pain. 20 tablet 0    linaCLOtide (LINZESS) 72 mcg Cap capsule Take 1 capsule (72 mcg total) by mouth before breakfast. 30 capsule 3    ondansetron (ZOFRAN-ODT) 4 MG TbDL Take 1 tablet (4 mg total) by mouth every 8 (eight) hours as needed (nausea and vomiting). 14 tablet 0     No current facility-administered medications for this visit.       Review of Systems:  General: No fever, chills, fatigability, or weight loss.  Skin: No rashes, itching, or changes in color or texture of skin.  Chest: Denies AGUDELO, cyanosis, wheezing, cough, and sputum production.  Abdomen: Appetite fine. No weight loss. Denies diarrhea, abdominal pain, hematemesis, or blood in stool.  Musculoskeletal: No joint stiffness or swelling. Denies back pain.  : As above.  All other review of systems negative.    PMH:  No past medical history on file.    PSH:  Past Surgical History:   Procedure Laterality Date    HYSTERECTOMY         FamHx:  Family History   Problem Relation Age of Onset    Breast cancer Mother     Diabetes Father        SocHx:  Social History      Socioeconomic History    Marital status: Single   Tobacco Use    Smoking status: Never     Passive exposure: Never    Smokeless tobacco: Never   Substance and Sexual Activity    Alcohol use: Yes     Alcohol/week: 1.0 standard drink of alcohol     Types: 1 Drinks containing 0.5 oz of alcohol per week    Drug use: Not Currently    Sexual activity: Yes       Physical Exam:  Vitals:    01/23/24 0845   BP: (!) 126/90   Pulse: 71   Resp: 20   Temp: 97.9 °F (36.6 °C)     General: A&Ox3, no apparent distress, no deformities  Neck: No masses, normal thyroid  Lungs: CTA jeremy, no use of accessory muscles  Heart: RRR, no arrhythmias  Abdomen: Soft, NT, ND, no masses, no hernias, no hepatosplenomegaly  Lymphatic: Neck and groin nodes negative  Skin: The skin is warm and dry. No jaundice.  Ext: No c/c/e.      Imaging: CT Abdomen/Pelvis 01/11/2023: No abnormality seen, upon visually overlooking the CT, a small abnormal cyst visualized as previously noted.        Impression:  Renal Lesion    Plan:  Instructed patient RTC 6 months with renal ultrasound to monitor renal lesion.  Instructed patient if develops any abnormal urologic symptoms notify clinic to be re-evaluated treated.

## 2024-01-31 LAB — NONINV COLON CA DNA+OCC BLD SCRN STL QL: NEGATIVE

## 2024-02-22 ENCOUNTER — TELEPHONE (OUTPATIENT)
Dept: INTERNAL MEDICINE | Facility: CLINIC | Age: 49
End: 2024-02-22
Payer: MEDICAID

## 2024-02-28 ENCOUNTER — LAB VISIT (OUTPATIENT)
Dept: LAB | Facility: HOSPITAL | Age: 49
End: 2024-02-28
Attending: INTERNAL MEDICINE
Payer: MEDICAID

## 2024-02-28 DIAGNOSIS — Z11.4 ENCOUNTER FOR SCREENING FOR HIV: ICD-10-CM

## 2024-02-28 DIAGNOSIS — Z11.59 NEED FOR HEPATITIS C SCREENING TEST: ICD-10-CM

## 2024-02-28 DIAGNOSIS — Z13.1 DIABETES MELLITUS SCREENING: ICD-10-CM

## 2024-02-28 LAB
CHOLEST SERPL-MCNC: 227 MG/DL
CHOLEST/HDLC SERPL: 4 {RATIO} (ref 0–5)
EST. AVERAGE GLUCOSE BLD GHB EST-MCNC: 99.7 MG/DL
HBA1C MFR BLD: 5.1 %
HCV AB SERPL QL IA: NONREACTIVE
HDLC SERPL-MCNC: 60 MG/DL (ref 35–60)
HIV 1+2 AB+HIV1 P24 AG SERPL QL IA: NONREACTIVE
LDLC SERPL CALC-MCNC: 135 MG/DL (ref 50–140)
TRIGL SERPL-MCNC: 161 MG/DL (ref 37–140)
VLDLC SERPL CALC-MCNC: 32 MG/DL

## 2024-02-28 PROCEDURE — 36415 COLL VENOUS BLD VENIPUNCTURE: CPT

## 2024-02-28 PROCEDURE — 80061 LIPID PANEL: CPT

## 2024-02-28 PROCEDURE — 87389 HIV-1 AG W/HIV-1&-2 AB AG IA: CPT

## 2024-02-28 PROCEDURE — 86803 HEPATITIS C AB TEST: CPT

## 2024-02-28 PROCEDURE — 83036 HEMOGLOBIN GLYCOSYLATED A1C: CPT

## 2024-04-24 ENCOUNTER — TELEPHONE (OUTPATIENT)
Dept: GYNECOLOGY | Facility: CLINIC | Age: 49
End: 2024-04-24

## 2024-04-24 ENCOUNTER — OFFICE VISIT (OUTPATIENT)
Dept: GYNECOLOGY | Facility: CLINIC | Age: 49
End: 2024-04-24
Payer: MEDICAID

## 2024-04-24 VITALS
DIASTOLIC BLOOD PRESSURE: 73 MMHG | SYSTOLIC BLOOD PRESSURE: 111 MMHG | HEIGHT: 64 IN | WEIGHT: 221.38 LBS | OXYGEN SATURATION: 100 % | BODY MASS INDEX: 37.8 KG/M2 | HEART RATE: 89 BPM | TEMPERATURE: 99 F | RESPIRATION RATE: 16 BRPM

## 2024-04-24 DIAGNOSIS — Z11.3 SCREENING FOR STD (SEXUALLY TRANSMITTED DISEASE): ICD-10-CM

## 2024-04-24 DIAGNOSIS — R30.0 DYSURIA: Primary | ICD-10-CM

## 2024-04-24 DIAGNOSIS — Z12.31 ENCOUNTER FOR SCREENING MAMMOGRAM FOR BREAST CANCER: ICD-10-CM

## 2024-04-24 DIAGNOSIS — N95.2 ATROPHIC VAGINITIS: ICD-10-CM

## 2024-04-24 DIAGNOSIS — N30.00 ACUTE CYSTITIS WITHOUT HEMATURIA: Primary | ICD-10-CM

## 2024-04-24 DIAGNOSIS — Z01.419 WELL WOMAN EXAM WITH ROUTINE GYNECOLOGICAL EXAM: ICD-10-CM

## 2024-04-24 LAB
AMORPH URATE CRY URNS QL MICRO: ABNORMAL /UL
APPEARANCE UR: ABNORMAL
BACTERIA #/AREA URNS AUTO: ABNORMAL /HPF
BILIRUB UR QL STRIP.AUTO: NEGATIVE
BILIRUB UR QL STRIP: NEGATIVE
C TRACH DNA SPEC QL NAA+PROBE: NOT DETECTED
CLUE CELLS VAG QL WET PREP: NORMAL
COLOR UR AUTO: ABNORMAL
GLUCOSE UR QL STRIP.AUTO: NORMAL
GLUCOSE UR QL STRIP: NEGATIVE
HYALINE CASTS #/AREA URNS LPF: ABNORMAL /LPF
KETONES UR QL STRIP.AUTO: NEGATIVE
KETONES UR QL STRIP: POSITIVE
LEUKOCYTE ESTERASE UR QL STRIP.AUTO: 500
LEUKOCYTE ESTERASE UR QL STRIP: POSITIVE
MUCOUS THREADS URNS QL MICRO: ABNORMAL /LPF
N GONORRHOEA DNA SPEC QL NAA+PROBE: NOT DETECTED
NITRITE UR QL STRIP.AUTO: NEGATIVE
PH UR STRIP.AUTO: 5.5 [PH]
PH, POC UA: 5.5
POC BLOOD, URINE: POSITIVE
POC NITRATES, URINE: NEGATIVE
PROT UR QL STRIP.AUTO: ABNORMAL
PROT UR QL STRIP: POSITIVE
RBC #/AREA URNS AUTO: ABNORMAL /HPF
RBC UR QL AUTO: NEGATIVE
SOURCE (OHS): NORMAL
SP GR UR STRIP.AUTO: 1.03 (ref 1–1.03)
SP GR UR STRIP: 1.03 (ref 1–1.03)
SQUAMOUS #/AREA URNS LPF: ABNORMAL /HPF
T VAGINALIS VAG QL WET PREP: NORMAL
UROBILINOGEN UR STRIP-ACNC: 1 (ref 0.1–1.1)
UROBILINOGEN UR STRIP-ACNC: NORMAL
WBC #/AREA URNS AUTO: ABNORMAL /HPF
WBC #/AREA VAG WET PREP: NORMAL
YEAST SPEC QL WET PREP: NORMAL

## 2024-04-24 PROCEDURE — 3074F SYST BP LT 130 MM HG: CPT | Mod: CPTII,,,

## 2024-04-24 PROCEDURE — 3044F HG A1C LEVEL LT 7.0%: CPT | Mod: CPTII,,,

## 2024-04-24 PROCEDURE — 87491 CHLMYD TRACH DNA AMP PROBE: CPT

## 2024-04-24 PROCEDURE — 81003 URINALYSIS AUTO W/O SCOPE: CPT | Mod: PBBFAC

## 2024-04-24 PROCEDURE — 81001 URINALYSIS AUTO W/SCOPE: CPT

## 2024-04-24 PROCEDURE — 99214 OFFICE O/P EST MOD 30 MIN: CPT | Mod: PBBFAC

## 2024-04-24 PROCEDURE — 1159F MED LIST DOCD IN RCRD: CPT | Mod: CPTII,,,

## 2024-04-24 PROCEDURE — 3008F BODY MASS INDEX DOCD: CPT | Mod: CPTII,,,

## 2024-04-24 PROCEDURE — 87210 SMEAR WET MOUNT SALINE/INK: CPT

## 2024-04-24 PROCEDURE — 99213 OFFICE O/P EST LOW 20 MIN: CPT | Mod: 25,S$PBB,,

## 2024-04-24 PROCEDURE — 3078F DIAST BP <80 MM HG: CPT | Mod: CPTII,,,

## 2024-04-24 PROCEDURE — 99386 PREV VISIT NEW AGE 40-64: CPT | Mod: S$PBB,,,

## 2024-04-24 RX ORDER — SULFAMETHOXAZOLE AND TRIMETHOPRIM 800; 160 MG/1; MG/1
1 TABLET ORAL 2 TIMES DAILY
Qty: 10 TABLET | Refills: 0 | Status: SHIPPED | OUTPATIENT
Start: 2024-04-24 | End: 2024-04-29

## 2024-04-24 NOTE — TELEPHONE ENCOUNTER
Please inform pt that the urinalysis does show that she has a UTI. I have sent a rx to her pharmacy for Bactrim for 5 days. Instruct to continue taking antibiotics even if she starts to feel better and advised to avoid alcohol, caffeine, tea and carbonated drinks. Increase water intake and cranberry juice may be helpful. Pt instructed to always wipe from front to back and make sure to empty bladder often and completely and before and after intercourse.

## 2024-04-24 NOTE — PROGRESS NOTES
[facility] -  Gynecology / Women's Health Clinic     Subjective:      Patient ID: Lilian Goddard is a 48 y.o. female.    Chief Complaint:  Gynecologic Exam      History of Present Illness:  The patient  (all c-sections) here for annual exam. Pt had hysterectomy  for AUB, reported was told BSO. Denies history of abnormal paps. MG- 23 & BIRADS 1. Denies breast complaints. Denies pelvic pain, abnormal bleeding or discharge. Pt reports no STIs in the past and desires testing, declined labs. Denies hot flashes. Denies tobacco use. Dep. screening 0. Denies fly hx of ovarian, uterine or colon cancer. Maternal grandmother with breast cancer. Cologuard  neg.    The patient presents to the clinic with c/o dysuria and strong urine odor, starting 3-4 days ago. Pt admits to drinking one coke daily. Pt reported no use of scented soaps during showers, denies douching. Pt c/o vaginal dryness for about 1 yr now. Pt has hx of hysterectomy in  for AUB. Pt denies using ky-jelly during sexual intercourse. Pt denies Stroke, MI, or DVT/clots.      GYN & OB History:  No LMP recorded. Patient is premenopausal.     OB History    Para Term  AB Living   6 6 6         SAB IAB Ectopic Multiple Live Births                  # Outcome Date GA Lbr Paco/2nd Weight Sex Type Anes PTL Lv   6 Term      CS-LTranv      5 Term      CS-LTranv      4 Term      CS-LTranv      3 Term      CS-LTranv      2 Term      CS-LTranv      1 Term      CS-LTranv          No past medical history on file.     Past Surgical History:   Procedure Laterality Date     SECTION      HYSTERECTOMY      TUBAL LIGATION          Social History     Tobacco Use    Smoking status: Never     Passive exposure: Never    Smokeless tobacco: Never   Substance and Sexual Activity    Alcohol use: Yes     Alcohol/week: 1.0 standard drink of alcohol     Types: 1 Drinks containing 0.5 oz of alcohol per week    Drug use: Not  "Currently    Sexual activity: Yes        Current Outpatient Medications   Medication Instructions    ALPRAZolam (XANAX) 1 mg, Oral, 3 times daily    [START ON 4/25/2024] conjugated estrogens (PREMARIN) 0.5 g, Vaginal, Twice weekly, Apply dime size to gloved finger and apply to vaginal walls nightly x1 week then use 2x/week.    DULoxetine (CYMBALTA) 30 mg, Oral, Every morning    ibuprofen (ADVIL,MOTRIN) 800 mg, Oral, Every 6 hours PRN    linaCLOtide (LINZESS) 72 mcg, Oral, Before breakfast    ondansetron (ZOFRAN-ODT) 4 mg, Oral, Every 8 hours PRN       Review of patient's allergies indicates:   Allergen Reactions    Tramadol Other (See Comments) and Hives         Review of Systems:  Review of Systems  Negative except for pertinent findings for positives per HPI.     Objective:   Physical Exam   Visit Vitals  /73   Pulse 89   Temp 98.6 °F (37 °C) (Oral)   Resp 16   Ht 5' 4" (1.626 m)   Wt 100.4 kg (221 lb 6.4 oz)   SpO2 100%   BMI 38.00 kg/m²       GENERAL: Well-developed female in no acute distress.  SKIN: Normal to inspection,warm, dry and intact.  BREASTS: No rashes or erythema. No masses, lumps, discharge, tenderness.  VULVA: General appearance WNL; external genitalia with no lesions or erythema.  BIMANUAL EXAM: Vaginal mucosa/vault pale, Vaginal cuff intact. Uterus/Cervix surgically absent. Ovaries surgically absent. Bilateral adnexa reveal no tenderness.  PSYCHIATRIC: Patient is oriented to person, place, and time. Mood and affect are normal.    Assessment:       ICD-10-CM ICD-9-CM   1. Dysuria  R30.0 788.1   2. Well woman exam with routine gynecological exam  Z01.419 V72.31   3. Screening for STD (sexually transmitted disease)  Z11.3 V74.5   4. Encounter for screening mammogram for breast cancer  Z12.31 V76.12   5. Atrophic vaginitis  N95.2 627.3       Plan:     1. Dysuria  -     POCT Urinalysis, Dipstick, Automated, W/O Scope  -     Urinalysis, Reflex to Urine Culture    2. Well woman exam with routine " gynecological exam  -     Ambulatory referral/consult to Gynecology    3. Screening for STD (sexually transmitted disease)  -     Chlamydia/GC, PCR  -     Wet Prep, Genital    4. Encounter for screening mammogram for breast cancer  -     Mammo Digital Screening Bilat w/ Ken; Future; Expected date: 12/17/2024    5. Atrophic vaginitis  -     conjugated estrogens (PREMARIN) vaginal cream; Place 0.5 g vaginally twice a week. Apply dime size to gloved finger and apply to vaginal walls nightly x1 week then use 2x/week.  Dispense: 30 g; Refill: 6    Pelvic exam today, pap deferred d/t hysterectomy of benign causes  STD testing, declined labs  MG ordered     Urine dip - +trace leukocytes, trace blood; Will order UA Reflex to culture  Atrophic vaginitis - premarin cream prescription, use ky-jelly during sexual intercourse  GSM-changes such as vaginal dryness, decreased elasticity and thinning vaginal mucosa, dyspareunia, irritation, burning, itching, dysuria, urgency, incontinence, recurrent UTIs, Vaginal estrogen can restore some elasticity to the vagina and can decrease the rate of recurrent UTIs, decrease vaginal pH, and increase vaginal lactobacillus.  Use a pea-size amount to the vagina every night for 2 weeks, then 2 times per week.   Follow up in about 1 year (around 4/24/2025) for Annual.

## 2024-04-25 NOTE — TELEPHONE ENCOUNTER
Pt notified via phone of their results per Aliya Middleton NP request. Voiced understanding no concerns or questions at this time.

## 2024-05-02 DIAGNOSIS — Z30.011 ENCOUNTER FOR INITIAL PRESCRIPTION OF CONTRACEPTIVE PILLS: ICD-10-CM

## 2024-07-05 DIAGNOSIS — N28.9 RENAL LESION: Primary | ICD-10-CM

## 2025-02-18 ENCOUNTER — OFFICE VISIT (OUTPATIENT)
Dept: GYNECOLOGY | Facility: CLINIC | Age: 50
End: 2025-02-18
Payer: MEDICAID

## 2025-02-18 ENCOUNTER — LAB VISIT (OUTPATIENT)
Dept: LAB | Facility: HOSPITAL | Age: 50
End: 2025-02-18
Payer: MEDICAID

## 2025-02-18 ENCOUNTER — TELEPHONE (OUTPATIENT)
Dept: GYNECOLOGY | Facility: CLINIC | Age: 50
End: 2025-02-18

## 2025-02-18 VITALS
DIASTOLIC BLOOD PRESSURE: 98 MMHG | WEIGHT: 223 LBS | OXYGEN SATURATION: 95 % | SYSTOLIC BLOOD PRESSURE: 143 MMHG | BODY MASS INDEX: 38.07 KG/M2 | RESPIRATION RATE: 18 BRPM | HEIGHT: 64 IN | HEART RATE: 83 BPM | TEMPERATURE: 98 F

## 2025-02-18 DIAGNOSIS — R30.0 DYSURIA: ICD-10-CM

## 2025-02-18 DIAGNOSIS — Z11.3 SCREENING FOR STD (SEXUALLY TRANSMITTED DISEASE): ICD-10-CM

## 2025-02-18 DIAGNOSIS — R10.2 PELVIC PAIN: ICD-10-CM

## 2025-02-18 DIAGNOSIS — Z11.3 SCREENING FOR STD (SEXUALLY TRANSMITTED DISEASE): Primary | ICD-10-CM

## 2025-02-18 LAB
BACTERIA #/AREA URNS AUTO: ABNORMAL /HPF
BILIRUB UR QL STRIP.AUTO: NEGATIVE
C TRACH DNA SPEC QL NAA+PROBE: NOT DETECTED
CLARITY UR: CLEAR
CLUE CELLS VAG QL WET PREP: NORMAL
COLOR UR AUTO: ABNORMAL
GLUCOSE UR QL STRIP: NORMAL
HBV SURFACE AG SERPL QL IA: NONREACTIVE
HCV AB SERPL QL IA: NONREACTIVE
HGB UR QL STRIP: NEGATIVE
HIV 1+2 AB+HIV1 P24 AG SERPL QL IA: NONREACTIVE
HYALINE CASTS #/AREA URNS LPF: ABNORMAL /LPF
KETONES UR QL STRIP: NEGATIVE
LEUKOCYTE ESTERASE UR QL STRIP: NEGATIVE
MUCOUS THREADS URNS QL MICRO: ABNORMAL /LPF
N GONORRHOEA DNA SPEC QL NAA+PROBE: NOT DETECTED
NITRITE UR QL STRIP: NEGATIVE
PH UR STRIP: 6 [PH]
PROT UR QL STRIP: NEGATIVE
RBC #/AREA URNS AUTO: ABNORMAL /HPF
RPR SER QL: REACTIVE
RPR SER-TITR: ABNORMAL {TITER}
SOURCE (OHS): NORMAL
SP GR UR STRIP.AUTO: 1.02 (ref 1–1.03)
SQUAMOUS #/AREA URNS LPF: ABNORMAL /HPF
T PALLIDUM AB SER QL: REACTIVE
T VAGINALIS VAG QL WET PREP: NORMAL
UROBILINOGEN UR STRIP-ACNC: NORMAL
WBC #/AREA URNS AUTO: ABNORMAL /HPF
WBC #/AREA VAG WET PREP: NORMAL
YEAST SPEC QL WET PREP: NORMAL

## 2025-02-18 PROCEDURE — 99214 OFFICE O/P EST MOD 30 MIN: CPT | Mod: PBBFAC

## 2025-02-18 PROCEDURE — 87491 CHLMYD TRACH DNA AMP PROBE: CPT

## 2025-02-18 PROCEDURE — 86803 HEPATITIS C AB TEST: CPT

## 2025-02-18 PROCEDURE — 86780 TREPONEMA PALLIDUM: CPT

## 2025-02-18 PROCEDURE — 87210 SMEAR WET MOUNT SALINE/INK: CPT

## 2025-02-18 PROCEDURE — 87389 HIV-1 AG W/HIV-1&-2 AB AG IA: CPT

## 2025-02-18 PROCEDURE — 36415 COLL VENOUS BLD VENIPUNCTURE: CPT

## 2025-02-18 PROCEDURE — 81001 URINALYSIS AUTO W/SCOPE: CPT

## 2025-02-18 PROCEDURE — 87340 HEPATITIS B SURFACE AG IA: CPT

## 2025-02-18 PROCEDURE — 86592 SYPHILIS TEST NON-TREP QUAL: CPT

## 2025-02-18 NOTE — TELEPHONE ENCOUNTER
verified. Patient concerned about Syphilis antibody reactive result, awaiting RPR. 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   Called Rae with [facility], confirmed patient with old Syphilis exposure treated 2008 with 1 dose bicillin in [place].  RPR 1:4;  RPR 1:2;  RPR 1:4, reported will look in older records and follow up.

## 2025-02-18 NOTE — PROGRESS NOTES
[facility] -  Gynecology / Women's Health Clinic     Subjective:      Patient ID: Lilian Goddard is a 49 y.o. female.    Chief Complaint:  Exposure to STD      History of Present Illness:  The patient presents to the clinic with c/o dysuria, pelvic pain and frequency with request for STD testing. Patient stated after unprotected intercourse about 1 week ago, noticed symptoms after sexual intercourse. Denies abnormal vaginal discharge. Admits to using Premarin cream with relief 2x/week.     GYN & OB History:  No LMP recorded. Patient has had a hysterectomy.     OB History    Para Term  AB Living   6 6 6      SAB IAB Ectopic Multiple Live Births             # Outcome Date GA Lbr Paco/2nd Weight Sex Type Anes PTL Lv   6 Term      CS-LTranv      5 Term      CS-LTranv      4 Term      CS-LTranv      3 Term      CS-LTranv      2 Term      CS-LTranv      1 Term      CS-LTranv          History reviewed. No pertinent past medical history.     Past Surgical History:   Procedure Laterality Date     SECTION      HYSTERECTOMY      TUBAL LIGATION          Social History     Tobacco Use    Smoking status: Never     Passive exposure: Never    Smokeless tobacco: Never   Substance and Sexual Activity    Alcohol use: Yes     Alcohol/week: 1.0 standard drink of alcohol     Types: 1 Drinks containing 0.5 oz of alcohol per week    Drug use: Not Currently    Sexual activity: Yes        Current Outpatient Medications   Medication Instructions    ALPRAZolam (XANAX) 1 mg, 3 times daily    conjugated estrogens (PREMARIN) 0.5 g, Vaginal, Twice weekly, Apply dime size to gloved finger and apply to vaginal walls nightly x1 week then use 2x/week.    DULoxetine (CYMBALTA) 30 mg, Every morning    ibuprofen (ADVIL,MOTRIN) 800 mg, Oral, Every 6 hours PRN    linaCLOtide (LINZESS) 72 mcg, Oral, Before breakfast    ondansetron (ZOFRAN-ODT) 4 mg, Oral, Every 8 hours PRN    traZODone (DESYREL) 100-200  "mg, Nightly PRN    traZODone (DESYREL)  mg, Oral, Nightly       Review of patient's allergies indicates:   Allergen Reactions    Tramadol Other (See Comments) and Hives         Review of Systems:  Review of Systems  Negative except for pertinent findings for positives per HPI.     Objective:   Physical Exam   Visit Vitals  BP (!) 143/98 (BP Location: Left arm, Patient Position: Sitting)   Pulse 83   Temp 97.6 °F (36.4 °C) (Oral)   Resp 18   Ht 5' 4" (1.626 m)   Wt 101.2 kg (223 lb)   SpO2 95%   BMI 38.28 kg/m²       GENERAL: Well-developed female in no acute distress.  SKIN: Normal to inspection,warm, dry and intact.  BREASTS: No rashes or erythema. No masses, lumps, discharge, tenderness.  VULVA: General appearance WNL; external genitalia with no lesions or erythema.  BIMANUAL EXAM: Vaginal mucosa/vault pale, Vaginal cuff intact. Uterus/Cervix surgically absent. Ovaries surgically absent. Bilateral adnexa reveal no tenderness.  PSYCHIATRIC: Patient is oriented to person, place, and time. Mood and affect are normal.    Assessment:       ICD-10-CM ICD-9-CM   1. Screening for STD (sexually transmitted disease)  Z11.3 V74.5   2. Pelvic pain  R10.2 [id number]   3. Dysuria  R30.0 788.1       Plan:     1. Screening for STD (sexually transmitted disease)  -     Wet Prep, Genital  -     HIV 1/2 Ag/Ab (4th Gen); Future; Expected date: 02/18/2025  -     Chlamydia/GC, PCR  -     Hepatitis C Antibody; Future; Expected date: 02/18/2025  -     Hepatitis B Surface Antigen; Future; Expected date: 02/18/2025  -     SYPHILIS ANTIBODY (WITH REFLEX RPR); Future; Expected date: 02/18/2025  -     Urinalysis, Reflex to Urine Culture    2. Pelvic pain  -     Wet Prep, Genital  -     Chlamydia/GC, PCR  -     Urinalysis, Reflex to Urine Culture    3. Dysuria  -     Wet Prep, Genital  -     Chlamydia/GC, PCR  -     Urinalysis, Reflex to Urine Culture    STD testing  Avoid alcohol, caffeine, tea and carbonated drinks. Increase water intake " and cranberry juice may be helpful. Pt instructed to always wipe from front to back and make sure to empty bladder often and completely and before and after intercourse.    Wet prep, G/C and U/A with reflex to Culture    Call with any GYN concerns  Follow up for Annual.

## 2025-02-20 ENCOUNTER — TELEPHONE (OUTPATIENT)
Dept: GYNECOLOGY | Facility: CLINIC | Age: 50
End: 2025-02-20
Payer: MEDICAID

## 2025-02-20 NOTE — TELEPHONE ENCOUNTER
verified. Rae with health unit reported  VDRL 1:4 given 3 shots of Bicillin,  1 shot was given for  in [place]. 25 RPR 2 [place] which is equivalent to 1:2 VRDL showing no active infection or new infection, no need to treat today. Reported to patient, patient stated understanding. Denies any symptoms of genital lesions.

## 2025-04-29 ENCOUNTER — LAB VISIT (OUTPATIENT)
Dept: LAB | Facility: HOSPITAL | Age: 50
End: 2025-04-29
Payer: MEDICAID

## 2025-04-29 ENCOUNTER — OFFICE VISIT (OUTPATIENT)
Dept: GYNECOLOGY | Facility: CLINIC | Age: 50
End: 2025-04-29
Payer: MEDICAID

## 2025-04-29 VITALS
TEMPERATURE: 98 F | HEIGHT: 64 IN | RESPIRATION RATE: 18 BRPM | DIASTOLIC BLOOD PRESSURE: 82 MMHG | SYSTOLIC BLOOD PRESSURE: 136 MMHG | HEART RATE: 78 BPM | WEIGHT: 223.38 LBS | BODY MASS INDEX: 38.13 KG/M2 | OXYGEN SATURATION: 98 %

## 2025-04-29 DIAGNOSIS — Z11.3 SCREENING FOR STD (SEXUALLY TRANSMITTED DISEASE): ICD-10-CM

## 2025-04-29 DIAGNOSIS — Z01.419 WELL WOMAN EXAM WITH ROUTINE GYNECOLOGICAL EXAM: Primary | ICD-10-CM

## 2025-04-29 LAB
C TRACH DNA SPEC QL NAA+PROBE: NOT DETECTED
CLUE CELLS VAG QL WET PREP: ABNORMAL
HBV SURFACE AG SERPL QL IA: NONREACTIVE
HCV AB SERPL QL IA: NONREACTIVE
HIV 1+2 AB+HIV1 P24 AG SERPL QL IA: NONREACTIVE
N GONORRHOEA DNA SPEC QL NAA+PROBE: NOT DETECTED
SOURCE (OHS): NORMAL
T PALLIDUM AB SER QL: REACTIVE
T VAGINALIS VAG QL WET PREP: ABNORMAL
WBC #/AREA VAG WET PREP: ABNORMAL
YEAST SPEC QL WET PREP: ABNORMAL

## 2025-04-29 PROCEDURE — 86592 SYPHILIS TEST NON-TREP QUAL: CPT

## 2025-04-29 PROCEDURE — 36415 COLL VENOUS BLD VENIPUNCTURE: CPT

## 2025-04-29 PROCEDURE — 99214 OFFICE O/P EST MOD 30 MIN: CPT | Mod: PBBFAC

## 2025-04-29 PROCEDURE — 1159F MED LIST DOCD IN RCRD: CPT | Mod: CPTII,,,

## 2025-04-29 PROCEDURE — 87491 CHLMYD TRACH DNA AMP PROBE: CPT

## 2025-04-29 PROCEDURE — 86803 HEPATITIS C AB TEST: CPT

## 2025-04-29 PROCEDURE — 87210 SMEAR WET MOUNT SALINE/INK: CPT

## 2025-04-29 PROCEDURE — 86696 HERPES SIMPLEX TYPE 2 TEST: CPT

## 2025-04-29 PROCEDURE — 86780 TREPONEMA PALLIDUM: CPT

## 2025-04-29 PROCEDURE — 3008F BODY MASS INDEX DOCD: CPT | Mod: CPTII,,,

## 2025-04-29 PROCEDURE — 3079F DIAST BP 80-89 MM HG: CPT | Mod: CPTII,,,

## 2025-04-29 PROCEDURE — 87389 HIV-1 AG W/HIV-1&-2 AB AG IA: CPT

## 2025-04-29 PROCEDURE — 99396 PREV VISIT EST AGE 40-64: CPT | Mod: S$PBB,,,

## 2025-04-29 PROCEDURE — 87340 HEPATITIS B SURFACE AG IA: CPT

## 2025-04-29 PROCEDURE — 3075F SYST BP GE 130 - 139MM HG: CPT | Mod: CPTII,,,

## 2025-04-29 RX ORDER — DULOXETIN HYDROCHLORIDE 20 MG/1
20 CAPSULE, DELAYED RELEASE ORAL
COMMUNITY
Start: 2025-04-07

## 2025-04-29 NOTE — PROGRESS NOTES
[facility] -  Gynecology / Women's Health Clinic     Subjective:      Patient ID: Lilian Goddard is a 49 y.o. female.    Chief Complaint:  Well Woman      History of Present Illness:  The patient  (all c-sections) here for annual exam. Pt had hysterectomy  for AUB, reported was told BSO. Denies history of abnormal paps. MG- 23 & BIRADS 1. Denies breast complaints. Denies pelvic pain, abnormal bleeding or discharge. Pt reports no STIs in the past and desires testing. Denies hot flashes. Denies tobacco use. Dep. screening 0. Denies fly hx of ovarian, uterine or colon cancer. Maternal grandmother with breast cancer. Cologuard  neg.     GYN & OB History:  No LMP recorded. Patient has had a hysterectomy.     OB History    Para Term  AB Living   6 6 6      SAB IAB Ectopic Multiple Live Births             # Outcome Date GA Lbr Paco/2nd Weight Sex Type Anes PTL Lv   6 Term      CS-LTranv      5 Term      CS-LTranv      4 Term      CS-LTranv      3 Term      CS-LTranv      2 Term      CS-LTranv      1 Term      CS-LTranv          No past medical history on file.     Past Surgical History:   Procedure Laterality Date     SECTION      HYSTERECTOMY      TUBAL LIGATION          Social History     Tobacco Use    Smoking status: Never     Passive exposure: Never    Smokeless tobacco: Never   Substance and Sexual Activity    Alcohol use: Not Currently     Alcohol/week: 1.0 standard drink of alcohol     Types: 1 Drinks containing 0.5 oz of alcohol per week    Drug use: Not Currently    Sexual activity: Yes     Partners: Male     Birth control/protection: See Surgical Hx        Current Outpatient Medications   Medication Instructions    ALPRAZolam (XANAX) 1 mg, 3 times daily    conjugated estrogens (PREMARIN) 0.5 g, Vaginal, Twice weekly, Apply dime size to gloved finger and apply to vaginal walls nightly x1 week then use 2x/week.    DULoxetine (CYMBALTA) 20 mg     "ibuprofen (ADVIL,MOTRIN) 800 mg, Oral, Every 6 hours PRN    linaCLOtide (LINZESS) 72 mcg, Oral, Before breakfast    ondansetron (ZOFRAN-ODT) 4 mg, Oral, Every 8 hours PRN    traZODone (DESYREL) 100-200 mg, Nightly PRN    traZODone (DESYREL)  mg, Oral, Nightly       Review of patient's allergies indicates:   Allergen Reactions    Tramadol Other (See Comments) and Hives         Review of Systems:  Review of Systems  Negative except for pertinent findings for positives per HPI.     Objective:   Physical Exam   Visit Vitals  /82 (BP Location: Right arm, Patient Position: Sitting)   Pulse 78   Temp 98 °F (36.7 °C) (Oral)   Resp 18   Ht 5' 4" (1.626 m)   Wt 101.3 kg (223 lb 6.4 oz)   SpO2 98%   BMI 38.35 kg/m²       GENERAL: Well-developed female in no acute distress.  SKIN: Normal to inspection,warm, dry and intact.  BREASTS: No rashes or erythema. No masses, lumps, discharge, tenderness.  VULVA: General appearance WNL; external genitalia with no lesions or erythema.  BIMANUAL EXAM: Vaginal mucosa/vault pale, Vaginal cuff intact. Uterus/Cervix surgically absent. Ovaries surgically absent. Bilateral adnexa reveal no tenderness.  PSYCHIATRIC: Patient is oriented to person, place, and time. Mood and affect are normal.    Assessment:       ICD-10-CM ICD-9-CM   1. Well woman exam with routine gynecological exam  Z01.419 V72.31   2. Screening for STD (sexually transmitted disease)  Z11.3 V74.5       Plan:     1. Well woman exam with routine gynecological exam    2. Screening for STD (sexually transmitted disease)  -     Chlamydia/GC, PCR  -     Wet Prep, Genital  -     HIV 1/2 Ag/Ab (4th Gen); Future; Expected date: 04/29/2025  -     Hepatitis C Antibody; Future; Expected date: 04/29/2025  -     Hepatitis B Surface Antigen; Future; Expected date: 04/29/2025  -     SYPHILIS ANTIBODY (WITH REFLEX RPR); Future; Expected date: 04/29/2025  -     HSV 1 & 2, IgG; Future; Expected date: 04/29/2025    Pelvic exam today, pap " deferred d/t hysterectomy of benign causes  MG ordered, scheduling number given to patient  STD testing, patient requesting HSV added    Call with any GYN concerns    Follow up in about 1 year (around 4/29/2026) for Annual.

## 2025-05-01 LAB
HSV1 IGG SERPL QL IA: POSITIVE
HSV2 IGG SERPL QL IA: POSITIVE
RPR SER QL: REACTIVE
RPR SER-TITR: ABNORMAL {TITER}

## 2025-08-06 ENCOUNTER — HOSPITAL ENCOUNTER (EMERGENCY)
Facility: HOSPITAL | Age: 50
Discharge: HOME | End: 2025-08-07
Attending: EMERGENCY MEDICINE
Payer: MEDICAID

## 2025-08-06 DIAGNOSIS — M54.15 RADICULOPATHY OF THORACOLUMBAR REGION: Primary | ICD-10-CM

## 2025-08-06 DIAGNOSIS — M25.559 HIP PAIN: ICD-10-CM

## 2025-08-06 LAB — POCT GLUCOSE: 140 MG/DL (ref 70–110)

## 2025-08-06 PROCEDURE — 63600175 PHARM REV CODE 636 W HCPCS: Mod: JZ,TB | Performed by: EMERGENCY MEDICINE

## 2025-08-06 PROCEDURE — 99284 EMERGENCY DEPT VISIT MOD MDM: CPT | Mod: 25

## 2025-08-06 PROCEDURE — 96372 THER/PROPH/DIAG INJ SC/IM: CPT | Performed by: EMERGENCY MEDICINE

## 2025-08-06 RX ORDER — KETOROLAC TROMETHAMINE 30 MG/ML
60 INJECTION, SOLUTION INTRAMUSCULAR; INTRAVENOUS
Status: COMPLETED | OUTPATIENT
Start: 2025-08-06 | End: 2025-08-06

## 2025-08-06 RX ADMIN — KETOROLAC TROMETHAMINE 60 MG: 60 INJECTION, SOLUTION INTRAMUSCULAR at 11:08

## 2025-08-07 VITALS
SYSTOLIC BLOOD PRESSURE: 150 MMHG | HEIGHT: 64 IN | DIASTOLIC BLOOD PRESSURE: 97 MMHG | WEIGHT: 216 LBS | TEMPERATURE: 98 F | BODY MASS INDEX: 36.88 KG/M2 | RESPIRATION RATE: 18 BRPM | HEART RATE: 85 BPM | OXYGEN SATURATION: 100 %

## 2025-08-07 RX ORDER — METHOCARBAMOL 750 MG/1
1500 TABLET, FILM COATED ORAL 3 TIMES DAILY
Qty: 30 TABLET | Refills: 0 | Status: SHIPPED | OUTPATIENT
Start: 2025-08-07 | End: 2025-08-12

## 2025-08-07 RX ORDER — NAPROXEN 500 MG/1
500 TABLET ORAL 2 TIMES DAILY WITH MEALS
Qty: 60 TABLET | Refills: 0 | Status: SHIPPED | OUTPATIENT
Start: 2025-08-07